# Patient Record
Sex: MALE | URBAN - METROPOLITAN AREA
[De-identification: names, ages, dates, MRNs, and addresses within clinical notes are randomized per-mention and may not be internally consistent; named-entity substitution may affect disease eponyms.]

---

## 2021-04-12 ENCOUNTER — APPOINTMENT (RX ONLY)
Dept: URBAN - METROPOLITAN AREA CLINIC 157 | Facility: CLINIC | Age: 26
Setting detail: DERMATOLOGY
End: 2021-04-12

## 2021-04-12 VITALS — DIASTOLIC BLOOD PRESSURE: 92 MMHG | HEART RATE: 74 BPM | SYSTOLIC BLOOD PRESSURE: 141 MMHG | RESPIRATION RATE: 18 BRPM

## 2021-04-12 PROBLEM — D48.5 NEOPLASM OF UNCERTAIN BEHAVIOR OF SKIN: Status: ACTIVE | Noted: 2021-04-12

## 2021-04-12 PROCEDURE — ? COUNSELING

## 2021-04-12 PROCEDURE — ? ADDITIONAL NOTES

## 2021-04-12 PROCEDURE — 99203 OFFICE O/P NEW LOW 30 MIN: CPT

## 2021-04-12 PROCEDURE — ? OTHER

## 2021-04-12 PROCEDURE — ? CONSULTATION FOR MOHS SURGERY

## 2021-04-12 PROCEDURE — ? DIAGNOSIS COMMENT

## 2021-04-12 NOTE — PROCEDURE: DIAGNOSIS COMMENT
Comment: Growth was excised approx 4 years ago. Pt states it regrew 5 months later and he was never informed of path results. He recently received the pathology report which stated “squamous cell carcinoma”.
Detail Level: Simple
Render Risk Assessment In Note?: no

## 2021-04-12 NOTE — PROCEDURE: ADDITIONAL NOTES
Detail Level: Generalized
Additional Notes: Spoke to Dr. Markle. Will obtain path slides and get another pathologist to look since clinically does not seem c/w SCC.
Render Risk Assessment In Note?: no

## 2021-04-12 NOTE — PROCEDURE: CONSULTATION FOR MOHS SURGERY
Detail Level: Detailed
Size Of Lesion: 2.5
X Size Of Lesion In Cm (Optional): 0
Incorporate Mauc In Note: Yes

## 2021-04-12 NOTE — PROCEDURE: OTHER
Detail Level: Zone
Other (Free Text): Lesion is 2.5 cm
Render Risk Assessment In Note?: no
Note Text (......Xxx Chief Complaint.): This diagnosis correlates with the